# Patient Record
Sex: MALE | Race: WHITE | Employment: OTHER | URBAN - NONMETROPOLITAN AREA
[De-identification: names, ages, dates, MRNs, and addresses within clinical notes are randomized per-mention and may not be internally consistent; named-entity substitution may affect disease eponyms.]

---

## 2024-01-16 ENCOUNTER — HOSPITAL ENCOUNTER (EMERGENCY)
Age: 19
Discharge: HOME | End: 2024-01-16

## 2024-01-16 VITALS
HEIGHT: 70 IN | RESPIRATION RATE: 18 BRPM | WEIGHT: 120 LBS | DIASTOLIC BLOOD PRESSURE: 65 MMHG | SYSTOLIC BLOOD PRESSURE: 125 MMHG | TEMPERATURE: 98.5 F | HEART RATE: 56 BPM | BODY MASS INDEX: 17.18 KG/M2 | OXYGEN SATURATION: 98 %

## 2024-01-16 DIAGNOSIS — M54.32 SCIATICA OF LEFT SIDE: Primary | ICD-10-CM

## 2024-01-16 PROCEDURE — 96372 THER/PROPH/DIAG INJ SC/IM: CPT

## 2024-01-16 PROCEDURE — 99284 EMERGENCY DEPT VISIT MOD MDM: CPT

## 2024-01-16 PROCEDURE — 6360000002 HC RX W HCPCS

## 2024-01-16 PROCEDURE — 6370000000 HC RX 637 (ALT 250 FOR IP)

## 2024-01-16 RX ORDER — LIDOCAINE 4 G/G
1 PATCH TOPICAL ONCE
Status: DISCONTINUED | OUTPATIENT
Start: 2024-01-16 | End: 2024-01-16 | Stop reason: HOSPADM

## 2024-01-16 RX ORDER — LIDOCAINE 4 G/G
1 PATCH TOPICAL DAILY
Qty: 30 PATCH | Refills: 0 | Status: SHIPPED | OUTPATIENT
Start: 2024-01-16 | End: 2024-02-15

## 2024-01-16 RX ORDER — KETOROLAC TROMETHAMINE 30 MG/ML
30 INJECTION, SOLUTION INTRAMUSCULAR; INTRAVENOUS ONCE
Status: COMPLETED | OUTPATIENT
Start: 2024-01-16 | End: 2024-01-16

## 2024-01-16 RX ORDER — CYCLOBENZAPRINE HCL 10 MG
5 TABLET ORAL ONCE
Status: COMPLETED | OUTPATIENT
Start: 2024-01-16 | End: 2024-01-16

## 2024-01-16 RX ORDER — KETOROLAC TROMETHAMINE 10 MG/1
10 TABLET, FILM COATED ORAL EVERY 6 HOURS PRN
Qty: 20 TABLET | Refills: 0 | Status: SHIPPED | OUTPATIENT
Start: 2024-01-16

## 2024-01-16 RX ORDER — CYCLOBENZAPRINE HCL 5 MG
5 TABLET ORAL 2 TIMES DAILY PRN
Qty: 10 TABLET | Refills: 0 | Status: SHIPPED | OUTPATIENT
Start: 2024-01-16 | End: 2024-01-26

## 2024-01-16 RX ADMIN — KETOROLAC TROMETHAMINE 30 MG: 30 INJECTION, SOLUTION INTRAMUSCULAR; INTRAVENOUS at 10:21

## 2024-01-16 RX ADMIN — CYCLOBENZAPRINE 5 MG: 10 TABLET, FILM COATED ORAL at 10:19

## 2024-01-16 ASSESSMENT — PAIN DESCRIPTION - ORIENTATION: ORIENTATION: LEFT;LOWER

## 2024-01-16 ASSESSMENT — PAIN - FUNCTIONAL ASSESSMENT: PAIN_FUNCTIONAL_ASSESSMENT: 0-10

## 2024-01-16 ASSESSMENT — PAIN SCALES - GENERAL
PAINLEVEL_OUTOF10: 6
PAINLEVEL_OUTOF10: 6

## 2024-01-16 ASSESSMENT — PAIN DESCRIPTION - LOCATION: LOCATION: BACK

## 2024-01-16 NOTE — DISCHARGE INSTRUCTIONS
Return to ER for true numbness in pelvis/leg.  Return for changes in bowel/bladder patterns indicative of retention/incontinence.  Return for fever chills or inability to walk/care for self.  Return for any other urgent/emergent medical concerns.    Okay to treat your pain with Toradol/lidocaine patches as ordered.  Okay to use cyclobenzaprine concomitantly but please do not operate machinery or cars, this medication will make you drowsy.    Sciatica typically lasts a relatively long time, please perform your exercises as described in your discharge paperwork.    If your symptoms not improve in the next few weeks please see your primary care provider or return to ER for additional examinations.    I hope you are feeling better soon!

## 2024-01-16 NOTE — ED PROVIDER NOTES
days, Disp-30 patch, R-0, Normal      ketorolac (TORADOL) 10 MG tablet Take 1 tablet by mouth every 6 hours as needed for Pain, Disp-20 tablet, R-0Normal      cyclobenzaprine (FLEXERIL) 5 MG tablet Take 1 tablet by mouth 2 times daily as needed for Muscle spasms, Disp-10 tablet, R-0Normal           FINAL IMPRESSION      1. Sciatica of left side        DISPOSITION/PLAN   DISPOSITION Decision To Discharge 01/16/2024 10:13:07 AM    OUTPATIENT FOLLOW UP THE PATIENT:  No follow-up provider specified.    Jorge Ruth, APRN - CNP    Please note that some or all of this chart was generated using [employer] voice recognition software. Although every effort was made to ensure the accuracy of this automated transcription, some errors in transcription may have occurred.         Jorge Ruth, APRN - CNP  01/16/24 1130

## 2024-01-16 NOTE — ED TRIAGE NOTES
Patient to ED c/o back pain x4 weeks. Patient able to ambulate and move. States ROM isn't as great as baseline. Rates pain 6/10.

## 2025-02-17 ENCOUNTER — OFFICE VISIT (OUTPATIENT)
Dept: FAMILY MEDICINE CLINIC | Age: 20
End: 2025-02-17

## 2025-02-17 VITALS
TEMPERATURE: 98.7 F | HEIGHT: 69 IN | DIASTOLIC BLOOD PRESSURE: 70 MMHG | BODY MASS INDEX: 17.77 KG/M2 | RESPIRATION RATE: 16 BRPM | SYSTOLIC BLOOD PRESSURE: 120 MMHG | OXYGEN SATURATION: 98 % | WEIGHT: 120 LBS | HEART RATE: 88 BPM

## 2025-02-17 DIAGNOSIS — R59.9 ADENOPATHY: ICD-10-CM

## 2025-02-17 DIAGNOSIS — J10.1 INFLUENZA A: Primary | ICD-10-CM

## 2025-02-17 RX ORDER — OSELTAMIVIR PHOSPHATE 75 MG/1
75 CAPSULE ORAL 2 TIMES DAILY
COMMUNITY
Start: 2025-02-14

## 2025-02-17 ASSESSMENT — PATIENT HEALTH QUESTIONNAIRE - PHQ9
SUM OF ALL RESPONSES TO PHQ QUESTIONS 1-9: 0
1. LITTLE INTEREST OR PLEASURE IN DOING THINGS: NOT AT ALL
2. FEELING DOWN, DEPRESSED OR HOPELESS: NOT AT ALL
SUM OF ALL RESPONSES TO PHQ9 QUESTIONS 1 & 2: 0

## 2025-02-17 NOTE — PROGRESS NOTES
[facility]  [address] [address]  Dept: [phone]  Dept Fax: [phone]  Loc: [phone]      Lisandro Mabry is a 19 y.o. male who presents todayfor his medical conditions/complaints as noted below.  Lisandro Mabry is c/o of Influenza (Flu A- Friday - [facility]/Swollen lymph nodes - )      :     History of Present Illness  The patient presents for evaluation of influenza.    He was seen at urgent care, treated there, and is currently taking Tamiflu. He still has some congestion, but the cough is improving. He does still have some tender lymph nodes and wonders about these. He would like to be evaluated for the lymph nodes in general feeling better than he was prior. No side effects noted on Tamiflu.    MEDICATIONS  Tamiflu  There is no problem list on file for this patient.     Goals    None       The patient is allergic to other, iodinated contrast media, aloe vera, azithromycin, and ciprofloxacin.    Medical History  Lisandro has no past medical history on file.    Past SurgicalHistory  The patient  has no past surgical history on file.    Family History  This patient's family history is not on file.    Social History  Lisandro  reports that he has never smoked. He has been exposed to tobacco smoke. He has never used smokeless tobacco. He reports that he does not currently use alcohol. He reports current drug use. Drug: Marijuana (Weed).    Medications    Current Outpatient Medications:     oseltamivir (TAMIFLU) 75 MG capsule, Take 1 capsule by mouth 2 times daily, Disp: , Rfl:     Subjective:      Review of Systems  As per HPI.  No other symptoms reported.     Objective:     Vitals:    02/17/25 1527   BP: 120/70   Site: Left Upper Arm   Position: Sitting   Pulse: 88   Resp: 16   Temp: 98.7 °F (37.1 °C)   TempSrc: Oral   SpO2: 98%   Weight: 54.4 kg (120 lb)   Height: 1.753 m (5' 9\")       Physical Exam    Physical Exam  Vitals reviewed.

## 2025-05-15 ENCOUNTER — OFFICE VISIT (OUTPATIENT)
Dept: FAMILY MEDICINE CLINIC | Age: 20
End: 2025-05-15
Payer: COMMERCIAL

## 2025-05-15 VITALS
BODY MASS INDEX: 18.7 KG/M2 | SYSTOLIC BLOOD PRESSURE: 112 MMHG | OXYGEN SATURATION: 98 % | RESPIRATION RATE: 16 BRPM | WEIGHT: 126.6 LBS | DIASTOLIC BLOOD PRESSURE: 78 MMHG | HEART RATE: 56 BPM

## 2025-05-15 DIAGNOSIS — K04.7 DENTAL INFECTION: Primary | ICD-10-CM

## 2025-05-15 PROBLEM — J45.909 ASTHMA: Status: ACTIVE | Noted: 2025-05-15

## 2025-05-15 PROCEDURE — 1036F TOBACCO NON-USER: CPT | Performed by: STUDENT IN AN ORGANIZED HEALTH CARE EDUCATION/TRAINING PROGRAM

## 2025-05-15 PROCEDURE — 99214 OFFICE O/P EST MOD 30 MIN: CPT | Performed by: STUDENT IN AN ORGANIZED HEALTH CARE EDUCATION/TRAINING PROGRAM

## 2025-05-15 PROCEDURE — G8427 DOCREV CUR MEDS BY ELIG CLIN: HCPCS | Performed by: STUDENT IN AN ORGANIZED HEALTH CARE EDUCATION/TRAINING PROGRAM

## 2025-05-15 PROCEDURE — G8420 CALC BMI NORM PARAMETERS: HCPCS | Performed by: STUDENT IN AN ORGANIZED HEALTH CARE EDUCATION/TRAINING PROGRAM

## 2025-05-15 RX ORDER — HYDROCODONE BITARTRATE AND ACETAMINOPHEN 5; 325 MG/1; MG/1
1 TABLET ORAL EVERY 4 HOURS PRN
Qty: 18 TABLET | Refills: 0 | Status: SHIPPED | OUTPATIENT
Start: 2025-05-15 | End: 2025-05-18

## 2025-05-15 NOTE — PATIENT INSTRUCTIONS
Ibuprofen 600mg every 6 hours.     Take antibiotic for 7 days. Then stop and if pain gets worse, take again for 7 more days.

## 2025-05-15 NOTE — PROGRESS NOTES
maintenance.  Instructed to continue current medications, diet and exercise.  Patient agreed with treatment plan.Follow up as directed.     Electronically signed by Jyoti Patterson MD on 5/23/2025 at 12:24 PM

## 2025-05-23 ASSESSMENT — ENCOUNTER SYMPTOMS
SHORTNESS OF BREATH: 0
NAUSEA: 0
VOMITING: 0
COUGH: 0
CONSTIPATION: 0
DIARRHEA: 0

## 2025-06-16 ENCOUNTER — OFFICE VISIT (OUTPATIENT)
Dept: FAMILY MEDICINE CLINIC | Age: 20
End: 2025-06-16
Payer: COMMERCIAL

## 2025-06-16 VITALS
RESPIRATION RATE: 16 BRPM | SYSTOLIC BLOOD PRESSURE: 116 MMHG | DIASTOLIC BLOOD PRESSURE: 62 MMHG | OXYGEN SATURATION: 97 % | BODY MASS INDEX: 18.19 KG/M2 | HEART RATE: 73 BPM | WEIGHT: 123.2 LBS

## 2025-06-16 DIAGNOSIS — Z11.3 SCREENING EXAMINATION FOR STI: ICD-10-CM

## 2025-06-16 DIAGNOSIS — F18.10 ABUSE OF SMOKED SUBSTANCE (HCC): ICD-10-CM

## 2025-06-16 DIAGNOSIS — Z11.59 NEED FOR HEPATITIS C SCREENING TEST: ICD-10-CM

## 2025-06-16 DIAGNOSIS — Z11.4 ENCOUNTER FOR SCREENING FOR HIV: ICD-10-CM

## 2025-06-16 DIAGNOSIS — Z00.00 WELL ADULT EXAM: Primary | ICD-10-CM

## 2025-06-16 DIAGNOSIS — J45.20 MILD INTERMITTENT ASTHMA WITHOUT COMPLICATION: ICD-10-CM

## 2025-06-16 DIAGNOSIS — R63.6 UNDERWEIGHT (BMI < 18.5): ICD-10-CM

## 2025-06-16 PROCEDURE — 1036F TOBACCO NON-USER: CPT | Performed by: STUDENT IN AN ORGANIZED HEALTH CARE EDUCATION/TRAINING PROGRAM

## 2025-06-16 PROCEDURE — G8419 CALC BMI OUT NRM PARAM NOF/U: HCPCS | Performed by: STUDENT IN AN ORGANIZED HEALTH CARE EDUCATION/TRAINING PROGRAM

## 2025-06-16 PROCEDURE — 99214 OFFICE O/P EST MOD 30 MIN: CPT | Performed by: STUDENT IN AN ORGANIZED HEALTH CARE EDUCATION/TRAINING PROGRAM

## 2025-06-16 PROCEDURE — G8427 DOCREV CUR MEDS BY ELIG CLIN: HCPCS | Performed by: STUDENT IN AN ORGANIZED HEALTH CARE EDUCATION/TRAINING PROGRAM

## 2025-06-16 PROCEDURE — 99395 PREV VISIT EST AGE 18-39: CPT | Performed by: STUDENT IN AN ORGANIZED HEALTH CARE EDUCATION/TRAINING PROGRAM

## 2025-06-16 SDOH — HEALTH STABILITY: PHYSICAL HEALTH: ON AVERAGE, HOW MANY DAYS PER WEEK DO YOU ENGAGE IN MODERATE TO STRENUOUS EXERCISE (LIKE A BRISK WALK)?: 5 DAYS

## 2025-06-16 SDOH — ECONOMIC STABILITY: FOOD INSECURITY: WITHIN THE PAST 12 MONTHS, YOU WORRIED THAT YOUR FOOD WOULD RUN OUT BEFORE YOU GOT MONEY TO BUY MORE.: NEVER TRUE

## 2025-06-16 SDOH — ECONOMIC STABILITY: FOOD INSECURITY: WITHIN THE PAST 12 MONTHS, THE FOOD YOU BOUGHT JUST DIDN'T LAST AND YOU DIDN'T HAVE MONEY TO GET MORE.: NEVER TRUE

## 2025-06-16 SDOH — HEALTH STABILITY: PHYSICAL HEALTH: ON AVERAGE, HOW MANY MINUTES DO YOU ENGAGE IN EXERCISE AT THIS LEVEL?: 150+ MIN

## 2025-06-16 ASSESSMENT — ENCOUNTER SYMPTOMS
DIARRHEA: 0
CONSTIPATION: 0
VOMITING: 0
NAUSEA: 0
SHORTNESS OF BREATH: 0
COUGH: 0

## 2025-06-16 ASSESSMENT — PATIENT HEALTH QUESTIONNAIRE - PHQ9
1. LITTLE INTEREST OR PLEASURE IN DOING THINGS: NOT AT ALL
2. FEELING DOWN, DEPRESSED OR HOPELESS: NOT AT ALL
SUM OF ALL RESPONSES TO PHQ QUESTIONS 1-9: 0

## 2025-06-16 NOTE — PROGRESS NOTES
[facility]  [facility]  [address] [address]  Dept: [phone]  Loc: [phone]    Lisandro Mabry is a 19 y.o. male who presents today for:  Chief Complaint   Patient presents with    New Patient     Est care   Would like full panel of blood work ordered         Goals    None         Assessment/Plan:     Lisandro was seen today for new patient.    Diagnoses and all orders for this visit:    Well adult exam    Underweight (BMI < 18.5)  -     CBC with Auto Differential; Future  -     Comprehensive Metabolic Panel; Future  -     Vitamin C; Future  -     Vitamin D 25 Hydroxy; Future  -     Vitamin B12 & Folate; Future  -     TSH reflex to FT4; Future  -     Hemoglobin A1C; Future    Abuse of smoked substance (HCC)    Mild intermittent asthma without complication    Encounter for screening for HIV  -     HIV Screen; Future    Need for hepatitis C screening test  -     Hepatitis C Antibody; Future    Screening examination for STI  -     RPR; Future  -     Herpes Simplex Virus (Hsv) II Glycoprotein Antibody IgG; Future  -     Torch IgG Battery; Future  -     C.trachomatis N.gonorrhoeae DNA, Urine; Future  -     C.trachomatis N.gonorrhoeae DNA, Urine      Well adult- recommended prevnar 20    Underweight: chronic, overall stable from prior. No concerns for eating disorders. Patient exercises appropriately. Will check labs as above.     Abuse of smoked substance: chronic/stable, marijuana, no desire to stop at this time.     Asthma: chronic/controlled by avoiding triggers, nothing to add/change at this time.     Hiv and hep c screens as above    Sti screening: patient desire, no current symptoms, no known exposures, will complete testing as above.            Return in about 1 year (around 6/16/2026) for physical.      Medications Prescribed:  No orders of the defined types were placed in this encounter.      No future appointments.    HPI:

## 2025-06-18 LAB
CHLAMYDIA DNA UR QL NAA+PROBE: ABNORMAL
CHLAMYDIA, GC DNA AMP, URINE: ABNORMAL
N GONORRHOEA DNA UR QL NAA+PROBE: NEGATIVE

## 2025-06-19 ENCOUNTER — RESULTS FOLLOW-UP (OUTPATIENT)
Dept: FAMILY MEDICINE CLINIC | Age: 20
End: 2025-06-19

## 2025-06-19 DIAGNOSIS — A74.9 CHLAMYDIA: Primary | ICD-10-CM

## 2025-06-19 RX ORDER — DOXYCYCLINE HYCLATE 100 MG
100 TABLET ORAL 2 TIMES DAILY
Qty: 14 TABLET | Refills: 0 | Status: SHIPPED | OUTPATIENT
Start: 2025-06-19 | End: 2025-06-26